# Patient Record
Sex: FEMALE | Race: BLACK OR AFRICAN AMERICAN | NOT HISPANIC OR LATINO | Employment: FULL TIME | URBAN - METROPOLITAN AREA
[De-identification: names, ages, dates, MRNs, and addresses within clinical notes are randomized per-mention and may not be internally consistent; named-entity substitution may affect disease eponyms.]

---

## 2019-12-02 ENCOUNTER — HOSPITAL ENCOUNTER (EMERGENCY)
Facility: OTHER | Age: 59
Discharge: HOME | End: 2019-12-03
Attending: EMERGENCY MEDICINE
Payer: COMMERCIAL

## 2019-12-02 DIAGNOSIS — R05.9 COUGH: ICD-10-CM

## 2019-12-02 DIAGNOSIS — J20.9 ACUTE BRONCHITIS, UNSPECIFIED ORGANISM: Primary | ICD-10-CM

## 2019-12-02 PROCEDURE — 99284 EMERGENCY DEPT VISIT MOD MDM: CPT | Mod: 25

## 2019-12-02 RX ORDER — HYDROCHLOROTHIAZIDE 25 MG/1
25 TABLET ORAL
COMMUNITY

## 2019-12-02 RX ORDER — ASPIRIN 81 MG/1
81 TABLET ORAL
COMMUNITY

## 2019-12-02 RX ORDER — BIMATOPROST 0.3 MG/ML
1 SOLUTION/ DROPS OPHTHALMIC
COMMUNITY
Start: 2014-01-17

## 2019-12-02 RX ORDER — LOSARTAN POTASSIUM 25 MG/1
25 TABLET ORAL
COMMUNITY

## 2019-12-02 RX ORDER — METFORMIN HYDROCHLORIDE 500 MG/1
500 TABLET ORAL
COMMUNITY

## 2019-12-02 RX ORDER — LOVASTATIN 40 MG/1
40 TABLET ORAL
COMMUNITY

## 2019-12-02 RX ORDER — AMLODIPINE BESYLATE 10 MG/1
10 TABLET ORAL
COMMUNITY
Start: 2014-01-17

## 2019-12-02 RX ORDER — LOSARTAN POTASSIUM 100 MG/1
100 TABLET ORAL
COMMUNITY

## 2019-12-02 RX ORDER — CARVEDILOL 6.25 MG/1
6.25 TABLET ORAL
COMMUNITY

## 2019-12-03 VITALS
OXYGEN SATURATION: 98 % | WEIGHT: 210 LBS | DIASTOLIC BLOOD PRESSURE: 89 MMHG | BODY MASS INDEX: 32.96 KG/M2 | HEART RATE: 80 BPM | RESPIRATION RATE: 18 BRPM | HEIGHT: 67 IN | SYSTOLIC BLOOD PRESSURE: 179 MMHG | TEMPERATURE: 99 F

## 2019-12-03 LAB
INFLUENZA A, MOLECULAR: NEGATIVE
INFLUENZA B, MOLECULAR: NEGATIVE
SPECIMEN SOURCE: NORMAL

## 2019-12-03 PROCEDURE — 94640 AIRWAY INHALATION TREATMENT: CPT

## 2019-12-03 PROCEDURE — 87502 INFLUENZA DNA AMP PROBE: CPT

## 2019-12-03 PROCEDURE — 25000242 PHARM REV CODE 250 ALT 637 W/ HCPCS: Performed by: PHYSICIAN ASSISTANT

## 2019-12-03 RX ORDER — IPRATROPIUM BROMIDE AND ALBUTEROL SULFATE 2.5; .5 MG/3ML; MG/3ML
3 SOLUTION RESPIRATORY (INHALATION)
Status: COMPLETED | OUTPATIENT
Start: 2019-12-03 | End: 2019-12-03

## 2019-12-03 RX ORDER — ALBUTEROL SULFATE 90 UG/1
1-2 AEROSOL, METERED RESPIRATORY (INHALATION) EVERY 6 HOURS PRN
Qty: 1 INHALER | Refills: 0 | Status: SHIPPED | OUTPATIENT
Start: 2019-12-03 | End: 2021-11-13 | Stop reason: SDUPTHER

## 2019-12-03 RX ORDER — FLUTICASONE PROPIONATE 50 MCG
1 SPRAY, SUSPENSION (ML) NASAL 2 TIMES DAILY PRN
Qty: 15 G | Refills: 0 | Status: SHIPPED | OUTPATIENT
Start: 2019-12-03

## 2019-12-03 RX ORDER — GUAIFENESIN/DEXTROMETHORPHAN 100-10MG/5
5 SYRUP ORAL 4 TIMES DAILY PRN
Qty: 120 ML | Refills: 0 | Status: SHIPPED | OUTPATIENT
Start: 2019-12-03 | End: 2019-12-13

## 2019-12-03 RX ORDER — PREDNISONE 20 MG/1
40 TABLET ORAL DAILY
Qty: 10 TABLET | Refills: 0 | Status: SHIPPED | OUTPATIENT
Start: 2019-12-03 | End: 2019-12-08

## 2019-12-03 RX ADMIN — IPRATROPIUM BROMIDE AND ALBUTEROL SULFATE 3 ML: .5; 3 SOLUTION RESPIRATORY (INHALATION) at 12:12

## 2019-12-03 NOTE — ED TRIAGE NOTES
Pt presents to ER with c/o non productive cough, chest congestion, and clear nasal drainage that started today. Denies fever/chills. Has not been exposed to anyone sick that she is aware. Has used coricidin for symptoms without relief. Reports getting flu vaccine this season.

## 2019-12-03 NOTE — ED PROVIDER NOTES
Encounter Date: 12/2/2019       History     Chief Complaint   Patient presents with    Cough     cough, runny nose since this morning. Denies body aches, fever     Patient is 59 year old female who presents with complaints of cough and nasal congestion. She reports that she has had nasal congestion for two weeks but admits that while at work tonight she started coughing and couldn't stay at work because the coughing was so bad. She reports that she has not taken any medications PTA. She denies associated hemoptysis. She denies associated fever, chills, chest pain, SOB, nausea, vomiting. She is currently unaccompanied in the ER.         Review of patient's allergies indicates:  No Known Allergies  No past medical history on file.  No past surgical history on file.  No family history on file.  Social History     Tobacco Use    Smoking status: Not on file   Substance Use Topics    Alcohol use: Not on file    Drug use: Not on file     Review of Systems   Constitutional: Negative for fever.   HENT: Positive for congestion. Negative for sore throat.    Respiratory: Positive for cough. Negative for shortness of breath.    Cardiovascular: Negative for chest pain.   Gastrointestinal: Negative for nausea.   Genitourinary: Negative for dysuria.   Musculoskeletal: Negative for back pain.   Skin: Negative for rash.   Neurological: Negative for weakness.   Hematological: Does not bruise/bleed easily.       Physical Exam     Initial Vitals [12/02/19 2335]   BP Pulse Resp Temp SpO2   (!) 190/94 90 18 98.3 °F (36.8 °C) 98 %      MAP       --         Physical Exam    Nursing note and vitals reviewed.  Constitutional: She appears well-developed and well-nourished. She is not diaphoretic. No distress.   Healthy appearing female in NAD or apparent pain. She makes good eye contact, speaks in clear full sentences and ambulates with ease. She has significant coughing during interview and exam.    HENT:   Head: Normocephalic and  "atraumatic.   Eyes: Conjunctivae and EOM are normal. Pupils are equal, round, and reactive to light. Right eye exhibits no discharge. Left eye exhibits no discharge. No scleral icterus.   Neck: Normal range of motion.   Cardiovascular: Normal rate, regular rhythm, normal heart sounds and intact distal pulses. Exam reveals no gallop and no friction rub.    No murmur heard.  Pulmonary/Chest: Breath sounds normal. She has no wheezes. She has no rhonchi. She has no rales.   Expiratory wheezing in all lung fields, deep inspiration yields coughing spells.    Abdominal: Soft. Bowel sounds are normal. There is no tenderness. There is no rebound and no guarding.   Benign    Musculoskeletal: Normal range of motion. She exhibits no edema or tenderness.   Lymphadenopathy:     She has no cervical adenopathy.   Neurological: She is alert and oriented to person, place, and time. She has normal strength. No cranial nerve deficit or sensory deficit. GCS score is 15. GCS eye subscore is 4. GCS verbal subscore is 5. GCS motor subscore is 6.   Skin: Skin is warm. Capillary refill takes less than 2 seconds. No rash and no abscess noted. No erythema.   Psychiatric: She has a normal mood and affect. Her behavior is normal. Thought content normal.         ED Course   Procedures  Labs Reviewed - No data to display       Imaging Results    None          Medical Decision Making:   ED Management:  Urgent evaluation of 59 year old female who presents with complaints of cough and nasal congestion. She is afebrile, non-toxic appearing and hypertensive at 190/94. She has adventitious breath sounds without significant HEENT abnormalities. Rapid flu and chest x-ray pending. Patient agrees to single duo-neb and declines prednisone stating "I don't like the way it makes me feel".     Update: patient better after DuoNeb.  Rapid flu is negative.  Patient admits she was just worried that she had to flu is relieved that is negative. I suspect her symptoms " are related to acute bronchitis and will treat with albuterol and will prescribe burst of steroids but I understand patient may not feel comfortable taking this.  She is also given cough and cold medication to help with symptoms.  She is educated on ED return precaution and instructed to follow up with primary care provider in 1-2 days for symptom recheck.  She verbalizes understanding and is amenable to plan.  She is stable for discharge.                                 Clinical Impression:       ICD-10-CM ICD-9-CM   1. Acute bronchitis, unspecified organism J20.9 466.0   2. Cough R05 786.2                             Gabriela Grullon PA-C  12/03/19 1517

## 2020-04-21 DIAGNOSIS — Z01.84 ANTIBODY RESPONSE EXAMINATION: ICD-10-CM

## 2020-04-23 ENCOUNTER — LAB VISIT (OUTPATIENT)
Dept: LAB | Facility: OTHER | Age: 60
End: 2020-04-23
Attending: INTERNAL MEDICINE
Payer: COMMERCIAL

## 2020-04-23 DIAGNOSIS — Z01.84 ANTIBODY RESPONSE EXAMINATION: ICD-10-CM

## 2020-04-23 LAB — SARS-COV-2 IGG SERPL QL IA: NEGATIVE

## 2020-04-23 PROCEDURE — 86769 SARS-COV-2 COVID-19 ANTIBODY: CPT

## 2020-04-23 PROCEDURE — 36415 COLL VENOUS BLD VENIPUNCTURE: CPT

## 2021-01-18 ENCOUNTER — IMMUNIZATION (OUTPATIENT)
Dept: INTERNAL MEDICINE | Facility: CLINIC | Age: 61
End: 2021-01-18
Payer: COMMERCIAL

## 2021-01-18 DIAGNOSIS — Z23 NEED FOR VACCINATION: Primary | ICD-10-CM

## 2021-01-18 PROCEDURE — 91300 COVID-19, MRNA, LNP-S, PF, 30 MCG/0.3 ML DOSE VACCINE: CPT | Mod: PBBFAC | Performed by: INTERNAL MEDICINE

## 2021-02-08 ENCOUNTER — IMMUNIZATION (OUTPATIENT)
Dept: INTERNAL MEDICINE | Facility: CLINIC | Age: 61
End: 2021-02-08
Payer: COMMERCIAL

## 2021-02-08 DIAGNOSIS — Z23 NEED FOR VACCINATION: Primary | ICD-10-CM

## 2021-02-08 PROCEDURE — 91300 COVID-19, MRNA, LNP-S, PF, 30 MCG/0.3 ML DOSE VACCINE: CPT | Mod: PBBFAC | Performed by: INTERNAL MEDICINE

## 2021-02-08 PROCEDURE — 0002A COVID-19, MRNA, LNP-S, PF, 30 MCG/0.3 ML DOSE VACCINE: CPT | Mod: PBBFAC | Performed by: INTERNAL MEDICINE

## 2021-03-18 ENCOUNTER — PATIENT MESSAGE (OUTPATIENT)
Dept: RESEARCH | Facility: HOSPITAL | Age: 61
End: 2021-03-18

## 2021-03-26 ENCOUNTER — PATIENT MESSAGE (OUTPATIENT)
Dept: RESEARCH | Facility: HOSPITAL | Age: 61
End: 2021-03-26

## 2021-08-11 ENCOUNTER — HOSPITAL ENCOUNTER (OUTPATIENT)
Dept: PREADMISSION TESTING | Facility: OTHER | Age: 61
Discharge: HOME | End: 2021-08-11
Attending: ANESTHESIOLOGY
Payer: COMMERCIAL

## 2021-08-11 DIAGNOSIS — R09.81 SINUS CONGESTION: Primary | ICD-10-CM

## 2021-08-11 LAB — SARS-COV-2 RDRP RESP QL NAA+PROBE: NEGATIVE

## 2021-08-11 PROCEDURE — U0002 COVID-19 LAB TEST NON-CDC: HCPCS | Performed by: ANESTHESIOLOGY

## 2021-11-12 ENCOUNTER — HOSPITAL ENCOUNTER (EMERGENCY)
Facility: OTHER | Age: 61
Discharge: HOME | End: 2021-11-13
Attending: EMERGENCY MEDICINE
Payer: COMMERCIAL

## 2021-11-12 DIAGNOSIS — R06.2 WHEEZING: ICD-10-CM

## 2021-11-12 DIAGNOSIS — J18.9 PNEUMONIA OF RIGHT MIDDLE LOBE DUE TO INFECTIOUS ORGANISM: Primary | ICD-10-CM

## 2021-11-12 DIAGNOSIS — R05.9 COUGH: ICD-10-CM

## 2021-11-12 LAB
CTP QC/QA: YES
SARS-COV-2 RDRP RESP QL NAA+PROBE: NEGATIVE

## 2021-11-12 PROCEDURE — 25000003 PHARM REV CODE 250: Performed by: EMERGENCY MEDICINE

## 2021-11-12 PROCEDURE — 99284 EMERGENCY DEPT VISIT MOD MDM: CPT | Mod: 25

## 2021-11-12 PROCEDURE — 94640 AIRWAY INHALATION TREATMENT: CPT

## 2021-11-12 PROCEDURE — 25000242 PHARM REV CODE 250 ALT 637 W/ HCPCS: Performed by: EMERGENCY MEDICINE

## 2021-11-12 PROCEDURE — 63600175 PHARM REV CODE 636 W HCPCS: Performed by: EMERGENCY MEDICINE

## 2021-11-12 PROCEDURE — U0002 COVID-19 LAB TEST NON-CDC: HCPCS | Performed by: EMERGENCY MEDICINE

## 2021-11-12 RX ORDER — DOXYCYCLINE HYCLATE 100 MG
100 TABLET ORAL
Status: COMPLETED | OUTPATIENT
Start: 2021-11-12 | End: 2021-11-12

## 2021-11-12 RX ORDER — IPRATROPIUM BROMIDE AND ALBUTEROL SULFATE 2.5; .5 MG/3ML; MG/3ML
3 SOLUTION RESPIRATORY (INHALATION)
Status: COMPLETED | OUTPATIENT
Start: 2021-11-12 | End: 2021-11-12

## 2021-11-12 RX ORDER — PREDNISONE 20 MG/1
60 TABLET ORAL
Status: COMPLETED | OUTPATIENT
Start: 2021-11-12 | End: 2021-11-12

## 2021-11-12 RX ADMIN — PREDNISONE 60 MG: 20 TABLET ORAL at 10:11

## 2021-11-12 RX ADMIN — IPRATROPIUM BROMIDE AND ALBUTEROL SULFATE 3 ML: .5; 2.5 SOLUTION RESPIRATORY (INHALATION) at 10:11

## 2021-11-12 RX ADMIN — DOXYCYCLINE HYCLATE 100 MG: 100 TABLET, COATED ORAL at 10:11

## 2021-11-13 VITALS
BODY MASS INDEX: 35.79 KG/M2 | HEIGHT: 67 IN | RESPIRATION RATE: 16 BRPM | SYSTOLIC BLOOD PRESSURE: 153 MMHG | OXYGEN SATURATION: 94 % | TEMPERATURE: 99 F | DIASTOLIC BLOOD PRESSURE: 70 MMHG | WEIGHT: 228 LBS | HEART RATE: 73 BPM

## 2021-11-13 LAB — POCT GLUCOSE: 134 MG/DL (ref 70–110)

## 2021-11-13 PROCEDURE — 82962 GLUCOSE BLOOD TEST: CPT

## 2021-11-13 PROCEDURE — 94640 AIRWAY INHALATION TREATMENT: CPT

## 2021-11-13 PROCEDURE — 25000242 PHARM REV CODE 250 ALT 637 W/ HCPCS: Performed by: EMERGENCY MEDICINE

## 2021-11-13 RX ORDER — PREDNISONE 20 MG/1
40 TABLET ORAL DAILY
Qty: 8 TABLET | Refills: 0 | Status: SHIPPED | OUTPATIENT
Start: 2021-11-13 | End: 2021-11-17

## 2021-11-13 RX ORDER — IPRATROPIUM BROMIDE AND ALBUTEROL SULFATE 2.5; .5 MG/3ML; MG/3ML
3 SOLUTION RESPIRATORY (INHALATION)
Status: COMPLETED | OUTPATIENT
Start: 2021-11-13 | End: 2021-11-13

## 2021-11-13 RX ORDER — ALBUTEROL SULFATE 90 UG/1
1-2 AEROSOL, METERED RESPIRATORY (INHALATION) EVERY 6 HOURS PRN
Qty: 18 G | Refills: 0 | Status: SHIPPED | OUTPATIENT
Start: 2021-11-13 | End: 2022-11-13

## 2021-11-13 RX ORDER — DOXYCYCLINE 100 MG/1
100 CAPSULE ORAL 2 TIMES DAILY
Qty: 13 CAPSULE | Refills: 0 | Status: SHIPPED | OUTPATIENT
Start: 2021-11-13 | End: 2021-11-20

## 2021-11-13 RX ADMIN — IPRATROPIUM BROMIDE AND ALBUTEROL SULFATE 3 ML: .5; 2.5 SOLUTION RESPIRATORY (INHALATION) at 12:11

## 2022-01-05 ENCOUNTER — IMMUNIZATION (OUTPATIENT)
Dept: INTERNAL MEDICINE | Facility: CLINIC | Age: 62
End: 2022-01-05

## 2022-01-05 DIAGNOSIS — Z23 NEED FOR VACCINATION: Primary | ICD-10-CM

## 2022-01-05 PROCEDURE — 0004A COVID-19, MRNA, LNP-S, PF, 30 MCG/0.3 ML DOSE VACCINE: CPT | Mod: PBBFAC

## 2023-07-11 ENCOUNTER — HOSPITAL ENCOUNTER (EMERGENCY)
Facility: OTHER | Age: 63
Discharge: HOME | End: 2023-07-11
Attending: EMERGENCY MEDICINE
Payer: COMMERCIAL

## 2023-07-11 VITALS
HEART RATE: 77 BPM | OXYGEN SATURATION: 99 % | WEIGHT: 228 LBS | TEMPERATURE: 98 F | BODY MASS INDEX: 35.71 KG/M2 | SYSTOLIC BLOOD PRESSURE: 199 MMHG | DIASTOLIC BLOOD PRESSURE: 91 MMHG | RESPIRATION RATE: 16 BRPM

## 2023-07-11 DIAGNOSIS — M25.572 LEFT ANKLE PAIN: ICD-10-CM

## 2023-07-11 PROCEDURE — 25000003 PHARM REV CODE 250: Performed by: EMERGENCY MEDICINE

## 2023-07-11 PROCEDURE — 99283 EMERGENCY DEPT VISIT LOW MDM: CPT

## 2023-07-11 RX ORDER — HYDROCODONE BITARTRATE AND ACETAMINOPHEN 5; 325 MG/1; MG/1
1 TABLET ORAL
Status: COMPLETED | OUTPATIENT
Start: 2023-07-11 | End: 2023-07-11

## 2023-07-11 RX ADMIN — HYDROCODONE BITARTRATE AND ACETAMINOPHEN 1 TABLET: 5; 325 TABLET ORAL at 08:07

## 2023-07-12 NOTE — DISCHARGE INSTRUCTIONS
Blood pressure is elevated in triage and should be rechecked on follow-up with your PCP.    Your pain today is suggestive ligament or tendon dysfunction, although difficult to localize.  You should follow up with Orthopedics to assess further and decide on further therapy.

## 2023-07-12 NOTE — ED PROVIDER NOTES
Chief complaint:  Ankle Pain (Left ankle pain s/t to tendonitis per the patient.)      HPI:  Idania Raphael is a 62 y.o. female with hx DM, htn, tobacco use presenting with 2 days of increased left ankle pain without fall or injury.  She endorses some degree of chronic ankle pain when she is on her feet a lot.   She endorses prior history of Achilles tendinopathy with similar pain in the past treated with walking boot and physical therapy.  Patient localizes the majority of pain over her Achilles.  Pain is worse with walking.  She denies swelling.  She denies numbness or weakness.  There is no associated knee or hip pain.  She denies fever, redness.  She denies pain in the foot.  She took ibuprofen earlier today for pain with mild relief.    ROS: As per HPI and below:  No rash, knee pain, fall or injury.    Review of patient's allergies indicates:  No Known Allergies    Patient's Medications   New Prescriptions    No medications on file   Previous Medications    ALBUTEROL (PROVENTIL/VENTOLIN HFA) 90 MCG/ACTUATION INHALER    Inhale 1-2 puffs into the lungs every 6 (six) hours as needed for Wheezing. Rescue    AMLODIPINE (NORVASC) 10 MG TABLET    Take 10 mg by mouth.    ASPIRIN (ECOTRIN) 81 MG EC TABLET    Take 81 mg by mouth.    BIMATOPROST (LUMIGAN) 0.03 % OPHTHALMIC DROPS    Apply 1 drop to eye.    CARVEDILOL (COREG) 6.25 MG TABLET    Take 6.25 mg by mouth.    FLUTICASONE PROPIONATE (FLONASE) 50 MCG/ACTUATION NASAL SPRAY    1 spray (50 mcg total) by Each Nostril route 2 (two) times daily as needed.    HYDROCHLOROTHIAZIDE (HYDRODIURIL) 25 MG TABLET    Take 25 mg by mouth.    LOSARTAN (COZAAR) 100 MG TABLET    Take 100 mg by mouth.    LOSARTAN (COZAAR) 25 MG TABLET    Take 25 mg by mouth.    LOVASTATIN (MEVACOR) 40 MG TABLET    Take 40 mg by mouth.    METFORMIN (GLUCOPHAGE) 500 MG TABLET    Take 500 mg by mouth.   Modified Medications    No medications on file   Discontinued Medications    No medications on file        PMH:  As per HPI and below:  Past Medical History:   Diagnosis Date    Diabetes mellitus     Glaucoma     Hypertension      Past Surgical History:   Procedure Laterality Date    ADENOIDECTOMY       SECTION      PARTIAL HYSTERECTOMY         Social History     Socioeconomic History    Marital status: Single   Tobacco Use    Smoking status: Every Day   Substance and Sexual Activity    Alcohol use: Yes     Comment: occ    Drug use: Never       History reviewed. No pertinent family history.    Physical Exam:    Vitals:    23 2053   BP:    Pulse:    Resp: 16   Temp:      GENERAL:  No apparent distress.  Alert.    HEENT:  Moist mucous membranes.  Normocephalic and atraumatic.    NECK:  No swelling.  Midline trachea.   CARDIOVASCULAR:  Regular rate and rhythm.  2+ radial pulses.    PULMONARY:  Lungs clear to auscultation bilaterally.  No wheezes, rales, or rhonci.    EXTREMITIES:  Warm and well perfused.  Brisk capillary refill.  Mild antalgic gait on the left.  Bimalleolar tenderness to the ankle without joint effusion.  There is minimal posterior Achilles tenderness without palpable deficit.  Normal Green test.  Limited active range of motion in flexion and extension in the ankle secondary to pain.  No other foot tenderness to palpation.  No erythema or increased warmth.  2+ DP and PT pulses.  NEUROLOGICAL:  Normal mental status.  Appropriate and conversant.  5/5 strength in the bilateral lower extremity as limited by pain.  Equal sensation light touch in the distal lower extremities bilaterally.  SKIN:  No rashes or ecchymoses.      Labs Reviewed - No data to display    Current Discharge Medication List        CONTINUE these medications which have NOT CHANGED    Details   albuterol (PROVENTIL/VENTOLIN HFA) 90 mcg/actuation inhaler Inhale 1-2 puffs into the lungs every 6 (six) hours as needed for Wheezing. Rescue  Qty: 18 g, Refills: 0      amLODIPine (NORVASC) 10 MG tablet Take 10 mg by mouth.       aspirin (ECOTRIN) 81 MG EC tablet Take 81 mg by mouth.      bimatoprost (LUMIGAN) 0.03 % ophthalmic drops Apply 1 drop to eye.      carvedilol (COREG) 6.25 MG tablet Take 6.25 mg by mouth.      fluticasone propionate (FLONASE) 50 mcg/actuation nasal spray 1 spray (50 mcg total) by Each Nostril route 2 (two) times daily as needed.  Qty: 15 g, Refills: 0      hydroCHLOROthiazide (HYDRODIURIL) 25 MG tablet Take 25 mg by mouth.      !! losartan (COZAAR) 100 MG tablet Take 100 mg by mouth.      !! losartan (COZAAR) 25 MG tablet Take 25 mg by mouth.      lovastatin (MEVACOR) 40 MG tablet Take 40 mg by mouth.      metFORMIN (GLUCOPHAGE) 500 MG tablet Take 500 mg by mouth.       !! - Potential duplicate medications found. Please discuss with provider.          Orders Placed This Encounter   Procedures    X-Ray Ankle Complete Left       Imaging Results              X-Ray Ankle Complete Left (In process)  Result time 07/11/23 20:52:56                    ED Course as of 07/11/23 2054 Tue Jul 11, 2023 2045 XR L ankle:  No fx or dislocation. (Independently interpreted by me) [MR]      ED Course User Index  [MR] Jame Gardner MD       MDM:    62 y.o. female with acute on chronic left ankle pain possibly consistent with Achilles tendinopathy.  Exam shows poorly localized pain with other tendinopathy or ligamentous dysfunction considered.  There is no mechanism for injury.  X-ray performed to exclude fracture or dislocation with low suspicion.  I have very low suspicion for emergent process such as arterial ischemia.  She is brisk distal pulses.  There is no other foot pain.  I doubt septic joint.  I do not think joint arthrocentesis is indicated here.  She is appropriate for weight-bearing as tolerated with orthopedic follow-up.  She may use acetaminophen as necessary for pain at home.  Tailor activities based on symptoms pending close follow-up.  Return precautions reviewed.    Diagnoses:    1. Left ankle pain        Jame Gardner MD  07/11/23 4090

## 2023-07-12 NOTE — ED TRIAGE NOTES
Pt presents c/o lt ankle pain that started yesterday. Swelling noted. Unable to move toes on lt foot. PMH of tendonitis per the pt. Denies any other symptoms at this time.

## 2023-08-24 ENCOUNTER — HOSPITAL ENCOUNTER (EMERGENCY)
Facility: OTHER | Age: 63
Discharge: HOME | End: 2023-08-24
Attending: EMERGENCY MEDICINE
Payer: COMMERCIAL

## 2023-08-24 VITALS
OXYGEN SATURATION: 98 % | TEMPERATURE: 98 F | WEIGHT: 229 LBS | BODY MASS INDEX: 35.94 KG/M2 | RESPIRATION RATE: 16 BRPM | SYSTOLIC BLOOD PRESSURE: 171 MMHG | HEART RATE: 52 BPM | DIASTOLIC BLOOD PRESSURE: 81 MMHG | HEIGHT: 67 IN

## 2023-08-24 DIAGNOSIS — I16.0 HYPERTENSIVE URGENCY: Primary | ICD-10-CM

## 2023-08-24 DIAGNOSIS — R07.9 CHEST PAIN: ICD-10-CM

## 2023-08-24 LAB
ALBUMIN SERPL BCP-MCNC: 3.9 G/DL (ref 3.5–5.2)
ALP SERPL-CCNC: 59 U/L (ref 55–135)
ALT SERPL W/O P-5'-P-CCNC: 26 U/L (ref 10–44)
ANION GAP SERPL CALC-SCNC: 11 MMOL/L (ref 8–16)
AST SERPL-CCNC: 21 U/L (ref 10–40)
BASOPHILS # BLD AUTO: 0.02 K/UL (ref 0–0.2)
BASOPHILS NFR BLD: 0.3 % (ref 0–1.9)
BILIRUB SERPL-MCNC: 0.2 MG/DL (ref 0.1–1)
BNP SERPL-MCNC: 26 PG/ML (ref 0–99)
BUN SERPL-MCNC: 10 MG/DL (ref 8–23)
CALCIUM SERPL-MCNC: 9.9 MG/DL (ref 8.7–10.5)
CHLORIDE SERPL-SCNC: 103 MMOL/L (ref 95–110)
CO2 SERPL-SCNC: 26 MMOL/L (ref 23–29)
CREAT SERPL-MCNC: 0.8 MG/DL (ref 0.5–1.4)
DIFFERENTIAL METHOD: ABNORMAL
EOSINOPHIL # BLD AUTO: 0.2 K/UL (ref 0–0.5)
EOSINOPHIL NFR BLD: 3.7 % (ref 0–8)
ERYTHROCYTE [DISTWIDTH] IN BLOOD BY AUTOMATED COUNT: 14.6 % (ref 11.5–14.5)
EST. GFR  (NO RACE VARIABLE): >60 ML/MIN/1.73 M^2
GLUCOSE SERPL-MCNC: 90 MG/DL (ref 70–110)
HCT VFR BLD AUTO: 39.6 % (ref 37–48.5)
HGB BLD-MCNC: 12.5 G/DL (ref 12–16)
IMM GRANULOCYTES # BLD AUTO: 0.01 K/UL (ref 0–0.04)
IMM GRANULOCYTES NFR BLD AUTO: 0.2 % (ref 0–0.5)
LYMPHOCYTES # BLD AUTO: 3.7 K/UL (ref 1–4.8)
LYMPHOCYTES NFR BLD: 61.7 % (ref 18–48)
MCH RBC QN AUTO: 27.5 PG (ref 27–31)
MCHC RBC AUTO-ENTMCNC: 31.6 G/DL (ref 32–36)
MCV RBC AUTO: 87 FL (ref 82–98)
MONOCYTES # BLD AUTO: 0.4 K/UL (ref 0.3–1)
MONOCYTES NFR BLD: 7.4 % (ref 4–15)
NEUTROPHILS # BLD AUTO: 1.6 K/UL (ref 1.8–7.7)
NEUTROPHILS NFR BLD: 26.7 % (ref 38–73)
NRBC BLD-RTO: 0 /100 WBC
PLATELET # BLD AUTO: 223 K/UL (ref 150–450)
PMV BLD AUTO: 10.2 FL (ref 9.2–12.9)
POTASSIUM SERPL-SCNC: 3.2 MMOL/L (ref 3.5–5.1)
PROT SERPL-MCNC: 7.3 G/DL (ref 6–8.4)
RBC # BLD AUTO: 4.55 M/UL (ref 4–5.4)
SODIUM SERPL-SCNC: 140 MMOL/L (ref 136–145)
TROPONIN I SERPL DL<=0.01 NG/ML-MCNC: <0.006 NG/ML (ref 0–0.03)
WBC # BLD AUTO: 5.92 K/UL (ref 3.9–12.7)

## 2023-08-24 PROCEDURE — 93010 EKG 12-LEAD: ICD-10-PCS | Mod: ,,, | Performed by: INTERNAL MEDICINE

## 2023-08-24 PROCEDURE — 85025 COMPLETE CBC W/AUTO DIFF WBC: CPT | Performed by: NURSE PRACTITIONER

## 2023-08-24 PROCEDURE — 93005 ELECTROCARDIOGRAM TRACING: CPT

## 2023-08-24 PROCEDURE — 83880 ASSAY OF NATRIURETIC PEPTIDE: CPT | Performed by: NURSE PRACTITIONER

## 2023-08-24 PROCEDURE — 80053 COMPREHEN METABOLIC PANEL: CPT | Performed by: NURSE PRACTITIONER

## 2023-08-24 PROCEDURE — 93010 ELECTROCARDIOGRAM REPORT: CPT | Mod: ,,, | Performed by: INTERNAL MEDICINE

## 2023-08-24 PROCEDURE — 84484 ASSAY OF TROPONIN QUANT: CPT | Performed by: NURSE PRACTITIONER

## 2023-08-24 PROCEDURE — 99285 EMERGENCY DEPT VISIT HI MDM: CPT | Mod: 25

## 2023-08-24 NOTE — FIRST PROVIDER EVALUATION
Emergency Department TeleTriage Encounter Note      CHIEF COMPLAINT    Chief Complaint   Patient presents with    Hypertension     Pt reporting HTN and HA this morning while at work and sent to ED. 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 Per pt, BP pta 199/87. Current /84. Hx of HTN. Pt reports compliance with medications. Denies vision changes.     Pt bradycardic with HR of 49 in triage and seeming lethargic       VITAL SIGNS   Initial Vitals [08/24/23 1226]   BP Pulse Resp Temp SpO2   (S) (!) 191/84 (S) (!) 49 18 97.9 °F (36.6 °C) 99 %      MAP       --            ALLERGIES    Review of patient's allergies indicates:  No Known Allergies    PROVIDER TRIAGE NOTE  Pt is a 64 y/o F who was sent to ED for high BP and HA. Reports that the headaches started today - aching, gradual onset. States that she did not sleep well last night. 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 Reports that she took her meds this morning - amlodipine, losartan and coreg. Denies any vision changes, nausea, vomiting, cp, sob, palpitations. States she feels tired.     Hypertensive, bradycardic. No obvious neuro deficits. Appears tired, AAOx4.       ORDERS  Labs Reviewed   CBC W/ AUTO DIFFERENTIAL   COMPREHENSIVE METABOLIC PANEL   TROPONIN I   TROPONIN I   B-TYPE NATRIURETIC PEPTIDE       ED Orders (720h ago, onward)      Start Ordered     Status Ordering Provider    08/24/23 1602 08/24/23 1301  Troponin I #2  Once Timed         Ordered NAALI WELDON    08/24/23 1312 08/24/23 1311  CT Head Without Contrast  1 time imaging         Ordered GARY PICKERING    08/24/23 1302 08/24/23 1301  Vital signs  Every 15 min         Ordered ANALI WELDON    08/24/23 1302 08/24/23 1301  Cardiac Monitoring - Adult  Continuous        Comments: Notify Physician If:    Ordered ANALI WELDON    08/24/23 1302 08/24/23 1301  Pulse Oximetry Continuous  Continuous         Ordered ANALI WELDON    08/24/23 1302 08/24/23 1301  Diet NPO  Diet  effective now         Ordered ANALI WELDON    08/24/23 1302 08/24/23 1301  Saline lock IV  Once         Ordered ANALI WELDON    08/24/23 1302 08/24/23 1301  EKG 12-lead  Once        Comments: Do not perform if previously done during this visit/ triage    Ordered ANALI WELDON    08/24/23 1302 08/24/23 1301  CBC auto differential  STAT         Ordered ANALI WELDON    08/24/23 1302 08/24/23 1301  Comprehensive metabolic panel  STAT         Ordered ANALI WELDON    08/24/23 1302 08/24/23 1301  Troponin I #1  STAT         Ordered ANALI WELDON    08/24/23 1302 08/24/23 1301  B-Type natriuretic peptide (BNP)  STAT         Ordered ANALI WELDON    08/24/23 1302 08/24/23 1301  X-Ray Chest AP Portable  1 time imaging         Ordered ANALI WELDON              Virtual Visit Note: The provider triage portion of this emergency department evaluation and documentation was performed via [employer], a HIPAA-compliant telemedicine application, in concert with a tele-presenter in the room. A face to face patient evaluation with one of my colleagues will occur once the patient is placed in an emergency department room.      DISCLAIMER: This note was prepared with [employer] voice recognition transcription software. Garbled syntax, mangled pronouns, and other bizarre constructions may be attributed to that software system.

## 2023-08-24 NOTE — ED NOTES
Pt ambulated around ED room with mobile pulse ox. HR up to 69, O2 sats 96% with ambulation. O2 sats reach max of 98% on RA at rest. Primary ED care team aware.

## 2023-08-24 NOTE — ED PROVIDER NOTES
"Encounter Date: 2023    SCRIBE #1 NOTE: I, Arnoldo Shalom, am scribing for, and in the presence of,  Aspen Alcala MD.       History     Chief Complaint   Patient presents with    Hypertension     Pt reporting HTN and HA this morning while at work and sent to ED. 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 Per pt, BP pta 199/87. Current /84. Hx of HTN. Pt reports compliance with medications. Denies vision changes.     Pt bradycardic with HR of 49 in triage and seeming lethargic     Time seen by provider: 3:36 PM    Amilcar Raphael is a 63 y.o. female, with a PMHx of DM and HTN, who presents to the ED with hypertension.  Patient states that she feels "off balance" with a mild headache, but denies any dizziness, chest pains, SOB, fevers, nausea, vomiting, or diarrhea. She notes attempt to take her blood pressure yesterday but states her at-home blood pressure machine was not working. She took it again today with systolic reading in the 190s and decided to visit the ED.  Her blood pressure normally runs in the 130s to 140s systolic.  She reports compliance with medication and notes taking most of her blood pressure medications in the morning, with the exception of carvedilol which she takes twice a day. This is the extent of the patient's complaints at this time.       The history is provided by the patient.     Review of patient's allergies indicates:  No Known Allergies  Past Medical History:   Diagnosis Date    Diabetes mellitus     Glaucoma     Hypertension      Past Surgical History:   Procedure Laterality Date    ADENOIDECTOMY       SECTION      PARTIAL HYSTERECTOMY       No family history on file.  Social History     Tobacco Use    Smoking status: Every Day     Current packs/day: 0.00   Substance Use Topics    Alcohol use: Yes     Comment: occ    Drug use: Never     Review of Systems   Constitutional:  Negative for chills and fever.   HENT:  Negative for congestion and rhinorrhea.    Respiratory:  " Negative for chest tightness and shortness of breath.    Cardiovascular:  Negative for chest pain and palpitations.   Gastrointestinal:  Negative for abdominal pain, diarrhea, nausea and vomiting.   Genitourinary:  Negative for dysuria and flank pain.   Musculoskeletal:  Negative for back pain and neck pain.   Skin:  Negative for color change and wound.   Neurological:  Positive for headaches. Negative for dizziness.       Physical Exam     Initial Vitals [08/24/23 1226]   BP Pulse Resp Temp SpO2   (S) (!) 191/84 (S) (!) 49 18 97.9 °F (36.6 °C) 99 %      MAP       --         Physical Exam    Nursing note and vitals reviewed.  Constitutional: She appears well-developed and well-nourished.   HENT:   Head: Normocephalic and atraumatic.   Eyes: Conjunctivae are normal.   Neck: Neck supple.   Normal range of motion.  Cardiovascular:  Normal rate, regular rhythm and normal heart sounds.           Pulmonary/Chest: Breath sounds normal. No respiratory distress. She has no wheezes. She has no rales.   Abdominal: Abdomen is soft. Bowel sounds are normal. She exhibits no distension. There is no abdominal tenderness. There is no rebound.   Musculoskeletal:         General: Normal range of motion.      Cervical back: Normal range of motion and neck supple.     Neurological: She is alert and oriented to person, place, and time.   Ambulatory with steady gait   Skin: Skin is warm and dry. Capillary refill takes less than 2 seconds.         ED Course   Procedures  Labs Reviewed   CBC W/ AUTO DIFFERENTIAL - Abnormal; Notable for the following components:       Result Value    MCHC 31.6 (*)     RDW 14.6 (*)     Gran # (ANC) 1.6 (*)     Gran % 26.7 (*)     Lymph % 61.7 (*)     All other components within normal limits   COMPREHENSIVE METABOLIC PANEL - Abnormal; Notable for the following components:    Potassium 3.2 (*)     All other components within normal limits   TROPONIN I   B-TYPE NATRIURETIC PEPTIDE     EKG Readings:  (Independently Interpreted)   Initial Reading: No STEMI. Heart Rate: 50.   1:04PM:  Rate of 50.  Sinus bradycardia.  Normal axis. Inferior and lateral T-wave inversions. No other ST or ischemic changes.         Imaging Results              X-Ray Chest AP Portable (Final result)  Result time 08/24/23 14:05:16      Final result by Dale Colvin MD (08/24/23 14:05:16)                   Impression:      No detrimental change or radiographic acute intrathoracic process seen on this limited single view.      Electronically signed by: Dale Colvin MD  Date:    08/24/2023  Time:    14:05               Narrative:    EXAMINATION:  XR CHEST AP PORTABLE    CLINICAL HISTORY:  Chest Pain;    TECHNIQUE:  Single frontal view of the chest was performed.    COMPARISON:  Chest radiograph 11/12/2021    FINDINGS:  Patient is somewhat rotated.  Clothing artifacts overlie the chest.  Resolution is limited by body habitus with underpenetration.    Bibasilar scattered linear opacities consistent with mild platelike scarring versus atelectasis similar to prior.  The lungs are otherwise symmetrically well expanded without consolidation, pleural effusion or pneumothorax.    The cardiac silhouette is normal in size. Mediastinal structures are midline with grossly similar calcification and tortuosity of the aorta.  Pulmonary vasculature and hilar contours are within normal limits.    No acute osseous process definitively seen.  PA and lateral views can be obtained.                                       CT Head Without Contrast (Final result)  Result time 08/24/23 14:10:47      Final result by Dale Colvin MD (08/24/23 14:10:47)                   Impression:      No acute large vascular territory infarct or intracranial hemorrhage identified.    Right caudate remote appearing lacunar type infarct.    Suspected periventricular white matter minimal chronic microvascular ischemic change.    Paranasal sinus disease.      Electronically signed  by: Dale Colvin MD  Date:    08/24/2023  Time:    14:10               Narrative:    EXAMINATION:  CT HEAD WITHOUT CONTRAST    CLINICAL HISTORY:  Headache, new or worsening (Age >= 50y);Hypertensive urgency;    TECHNIQUE:  Low dose axial CT images obtained throughout the head without intravenous contrast. Sagittal and coronal reconstructions were performed.    COMPARISON:  None.    FINDINGS:  Beam hardening with streak artifact somewhat limits evaluation.    Intracranial compartment: Brain appears normally formed.    Ventricles and sulci are normal in size for age without evidence of hydrocephalus. No extra-axial blood or fluid collections.  Prominent extra-axial calcifications primarily along the anterior interhemispheric fissure.    Small area of hypoattenuation with volume loss at the right caudate suggesting remote lacunar-type infarct.  Bilateral periventricular white matter minimal hypoattenuation likely sequela of chronic microvascular ischemic change in this age group.  No parenchymal mass, hemorrhage, edema or major vascular distribution infarct.    Skull/extracranial contents (limited evaluation): No acute displaced skull fracture.  Partially imaged remote postoperative change of the bilateral anterior ethmoidal air cells/upper nasal bridge and bilateral zygomatic arches, likely from remote trauma.  Mild patchy mucosal thickening of the paranasal sinuses without air-fluid levels.  There is asymmetric partially imaged hyperostosis of the upper left maxillary sinus likely sequela of remote/chronic sinusitis.  Mastoid air cells are clear.                                       Medications - No data to display  Medical Decision Making  Amount and/or Complexity of Data Reviewed  External Data Reviewed: labs.  Labs: ordered.  Radiology: ordered.  ECG/medicine tests: ordered.    Risk  Prescription drug management.  Decision regarding hospitalization.                          Medical Decision Making:   History:  "  Old Medical Records: I decided to obtain old medical records.  Old Records Summarized: other records and records from another hospital.  Initial Assessment:   3:36PM:  Patient is a 63-year-old female who presents to the emergency department with elevated blood pressures, and feeling "off balance." Patient appears well, nontoxic.  She is neurologically intact.  Will plan for labs, cardiac eval, EKG, will continue to follow and reassess.    Independently Interpreted Test(s):   I have ordered and independently interpreted EKG Reading(s) - see prior notes  Clinical Tests:   Lab Tests: Ordered and Reviewed  Radiological Study: Reviewed and Ordered  Medical Tests: Ordered and Reviewed    4:29PM:  Patient doing well, remains stable.  She is feeling much better and was able to ambulate with no issues.  Her blood pressure has improved.  Her labs are otherwise unremarkable with no acute findings.  I do not feel that further work up in the ED is indicated at this time.  I updated pt regarding results and I counseled pt regarding supportive care measures.  I have discussed with the pt ED return warnings and need for close PCP f/u.  Pt agreeable to plan and all questions answered.  I feel that pt is stable for discharge and management as an outpatient and no further intervention is needed at this time.  Pt is comfortable returning to the ED if needed.  Will DC home in stable condition.      Clinical Impression:   Final diagnoses:  [R07.9] Chest pain  [I16.0] Hypertensive urgency (Primary)        ED Disposition Condition    Discharge Stable          ED Prescriptions    None       Follow-up Information       Follow up With Specialties Details Why Contact Info    Renetta Nelson MD Internal Medicine   [address] [facility] [address]  [phone]               Fredrick, Aspen ALCALA MD  08/24/23 1726    "

## 2023-08-24 NOTE — ED TRIAGE NOTES
Pt presents to  ED with c/o uncontrolled HTN that began last night. Pt reports BP reading 199/87 PTA. HX of HTN. Pt reports compliance with meds. Denies vision changes, CP and SOB. AAOx4, NAD noted.

## 2023-09-14 ENCOUNTER — HOSPITAL ENCOUNTER (OUTPATIENT)
Dept: RADIOLOGY | Facility: OTHER | Age: 63
Discharge: HOME | End: 2023-09-14
Attending: FAMILY MEDICINE
Payer: COMMERCIAL

## 2023-09-14 DIAGNOSIS — Z12.31 VISIT FOR SCREENING MAMMOGRAM: ICD-10-CM

## 2023-09-14 PROCEDURE — 77067 SCR MAMMO BI INCL CAD: CPT | Mod: TC

## 2023-09-14 PROCEDURE — 77063 BREAST TOMOSYNTHESIS BI: CPT | Mod: 26,,, | Performed by: RADIOLOGY

## 2023-09-14 PROCEDURE — 77067 SCR MAMMO BI INCL CAD: CPT | Mod: 26,,, | Performed by: RADIOLOGY

## 2023-09-14 PROCEDURE — 77063 MAMMO DIGITAL SCREENING BILAT WITH TOMO: ICD-10-PCS | Mod: 26,,, | Performed by: RADIOLOGY

## 2023-09-14 PROCEDURE — 77067 MAMMO DIGITAL SCREENING BILAT WITH TOMO: ICD-10-PCS | Mod: 26,,, | Performed by: RADIOLOGY
